# Patient Record
Sex: FEMALE | Race: WHITE
[De-identification: names, ages, dates, MRNs, and addresses within clinical notes are randomized per-mention and may not be internally consistent; named-entity substitution may affect disease eponyms.]

---

## 2017-09-07 PROBLEM — Z00.00 ENCOUNTER FOR PREVENTIVE HEALTH EXAMINATION: Status: ACTIVE | Noted: 2017-09-07

## 2017-09-13 ENCOUNTER — APPOINTMENT (OUTPATIENT)
Dept: OBGYN | Facility: CLINIC | Age: 32
End: 2017-09-13

## 2018-06-24 ENCOUNTER — RESULT REVIEW (OUTPATIENT)
Age: 33
End: 2018-06-24

## 2018-06-24 ENCOUNTER — INPATIENT (INPATIENT)
Facility: HOSPITAL | Age: 33
LOS: 2 days | Discharge: HOME | End: 2018-06-27
Attending: OBSTETRICS & GYNECOLOGY | Admitting: OBSTETRICS & GYNECOLOGY
Payer: COMMERCIAL

## 2018-06-24 VITALS — WEIGHT: 200.62 LBS | HEIGHT: 66 IN

## 2018-06-24 DIAGNOSIS — Z3A.00 WEEKS OF GESTATION OF PREGNANCY NOT SPECIFIED: ICD-10-CM

## 2018-06-24 DIAGNOSIS — O26.899 OTHER SPECIFIED PREGNANCY RELATED CONDITIONS, UNSPECIFIED TRIMESTER: ICD-10-CM

## 2018-06-24 LAB
BASOPHILS NFR BLD AUTO: 0.3 % — SIGNIFICANT CHANGE UP (ref 0–2)
BLD GP AB SCN SERPL QL: NEGATIVE — SIGNIFICANT CHANGE UP
EOSINOPHIL NFR BLD AUTO: 1 % — SIGNIFICANT CHANGE UP (ref 0–6)
HCT VFR BLD CALC: 35.9 % — SIGNIFICANT CHANGE UP (ref 34.5–45)
HGB BLD-MCNC: 12.6 G/DL — SIGNIFICANT CHANGE UP (ref 11.5–15.5)
LYMPHOCYTES # BLD AUTO: 21.4 % — SIGNIFICANT CHANGE UP (ref 13–44)
MCHC RBC-ENTMCNC: 31.5 PG — SIGNIFICANT CHANGE UP (ref 27–34)
MCHC RBC-ENTMCNC: 35.1 G/DL — SIGNIFICANT CHANGE UP (ref 32–36)
MCV RBC AUTO: 89.8 FL — SIGNIFICANT CHANGE UP (ref 80–100)
MONOCYTES NFR BLD AUTO: 9.9 % — SIGNIFICANT CHANGE UP (ref 2–14)
NEUTROPHILS NFR BLD AUTO: 67.4 % — SIGNIFICANT CHANGE UP (ref 43–77)
PLATELET # BLD AUTO: 182 K/UL — SIGNIFICANT CHANGE UP (ref 150–400)
RBC # BLD: 4 M/UL — SIGNIFICANT CHANGE UP (ref 3.8–5.2)
RBC # FLD: 12 % — SIGNIFICANT CHANGE UP (ref 10.3–16.9)
RH IG SCN BLD-IMP: POSITIVE — SIGNIFICANT CHANGE UP
RH IG SCN BLD-IMP: POSITIVE — SIGNIFICANT CHANGE UP
WBC # BLD: 9 K/UL — SIGNIFICANT CHANGE UP (ref 3.8–10.5)
WBC # FLD AUTO: 9 K/UL — SIGNIFICANT CHANGE UP (ref 3.8–10.5)

## 2018-06-24 RX ORDER — SODIUM CHLORIDE 9 MG/ML
1000 INJECTION, SOLUTION INTRAVENOUS
Qty: 0 | Refills: 0 | Status: DISCONTINUED | OUTPATIENT
Start: 2018-06-24 | End: 2018-06-24

## 2018-06-24 RX ORDER — SODIUM CHLORIDE 9 MG/ML
1000 INJECTION, SOLUTION INTRAVENOUS
Qty: 0 | Refills: 0 | Status: DISCONTINUED | OUTPATIENT
Start: 2018-06-24 | End: 2018-06-27

## 2018-06-24 RX ORDER — SODIUM CHLORIDE 9 MG/ML
1000 INJECTION, SOLUTION INTRAVENOUS ONCE
Qty: 0 | Refills: 0 | Status: COMPLETED | OUTPATIENT
Start: 2018-06-24 | End: 2018-06-24

## 2018-06-24 RX ORDER — LANOLIN
1 OINTMENT (GRAM) TOPICAL
Qty: 0 | Refills: 0 | Status: DISCONTINUED | OUTPATIENT
Start: 2018-06-24 | End: 2018-06-27

## 2018-06-24 RX ORDER — SIMETHICONE 80 MG/1
80 TABLET, CHEWABLE ORAL EVERY 4 HOURS
Qty: 0 | Refills: 0 | Status: DISCONTINUED | OUTPATIENT
Start: 2018-06-24 | End: 2018-06-27

## 2018-06-24 RX ORDER — ACETAMINOPHEN 500 MG
650 TABLET ORAL EVERY 6 HOURS
Qty: 0 | Refills: 0 | Status: DISCONTINUED | OUTPATIENT
Start: 2018-06-24 | End: 2018-06-27

## 2018-06-24 RX ORDER — DIPHENHYDRAMINE HCL 50 MG
25 CAPSULE ORAL EVERY 6 HOURS
Qty: 0 | Refills: 0 | Status: DISCONTINUED | OUTPATIENT
Start: 2018-06-24 | End: 2018-06-27

## 2018-06-24 RX ORDER — CITRIC ACID/SODIUM CITRATE 300-500 MG
30 SOLUTION, ORAL ORAL ONCE
Qty: 0 | Refills: 0 | Status: DISCONTINUED | OUTPATIENT
Start: 2018-06-24 | End: 2018-06-24

## 2018-06-24 RX ORDER — NALOXONE HYDROCHLORIDE 4 MG/.1ML
0.1 SPRAY NASAL
Qty: 0 | Refills: 0 | Status: DISCONTINUED | OUTPATIENT
Start: 2018-06-24 | End: 2018-06-27

## 2018-06-24 RX ORDER — OXYCODONE AND ACETAMINOPHEN 5; 325 MG/1; MG/1
1 TABLET ORAL
Qty: 0 | Refills: 0 | Status: DISCONTINUED | OUTPATIENT
Start: 2018-06-24 | End: 2018-06-27

## 2018-06-24 RX ORDER — TETANUS TOXOID, REDUCED DIPHTHERIA TOXOID AND ACELLULAR PERTUSSIS VACCINE, ADSORBED 5; 2.5; 8; 8; 2.5 [IU]/.5ML; [IU]/.5ML; UG/.5ML; UG/.5ML; UG/.5ML
0.5 SUSPENSION INTRAMUSCULAR ONCE
Qty: 0 | Refills: 0 | Status: DISCONTINUED | OUTPATIENT
Start: 2018-06-24 | End: 2018-06-27

## 2018-06-24 RX ORDER — SODIUM CHLORIDE 9 MG/ML
500 INJECTION, SOLUTION INTRAVENOUS ONCE
Qty: 0 | Refills: 0 | Status: DISCONTINUED | OUTPATIENT
Start: 2018-06-24 | End: 2018-06-24

## 2018-06-24 RX ORDER — ONDANSETRON 8 MG/1
4 TABLET, FILM COATED ORAL EVERY 6 HOURS
Qty: 0 | Refills: 0 | Status: DISCONTINUED | OUTPATIENT
Start: 2018-06-24 | End: 2018-06-27

## 2018-06-24 RX ORDER — GLYCERIN ADULT
1 SUPPOSITORY, RECTAL RECTAL AT BEDTIME
Qty: 0 | Refills: 0 | Status: DISCONTINUED | OUTPATIENT
Start: 2018-06-24 | End: 2018-06-27

## 2018-06-24 RX ORDER — OXYTOCIN 10 UNIT/ML
41.67 VIAL (ML) INJECTION
Qty: 20 | Refills: 0 | Status: DISCONTINUED | OUTPATIENT
Start: 2018-06-24 | End: 2018-06-27

## 2018-06-24 RX ORDER — OXYTOCIN 10 UNIT/ML
333.33 VIAL (ML) INJECTION
Qty: 20 | Refills: 0 | Status: DISCONTINUED | OUTPATIENT
Start: 2018-06-24 | End: 2018-06-24

## 2018-06-24 RX ORDER — OXYTOCIN 10 UNIT/ML
1 VIAL (ML) INJECTION
Qty: 30 | Refills: 0 | Status: DISCONTINUED | OUTPATIENT
Start: 2018-06-24 | End: 2018-06-24

## 2018-06-24 RX ORDER — AZITHROMYCIN 500 MG/1
500 TABLET, FILM COATED ORAL ONCE
Qty: 0 | Refills: 0 | Status: DISCONTINUED | OUTPATIENT
Start: 2018-06-24 | End: 2018-06-24

## 2018-06-24 RX ORDER — DOCUSATE SODIUM 100 MG
100 CAPSULE ORAL
Qty: 0 | Refills: 0 | Status: DISCONTINUED | OUTPATIENT
Start: 2018-06-24 | End: 2018-06-27

## 2018-06-24 RX ORDER — FERROUS SULFATE 325(65) MG
325 TABLET ORAL DAILY
Qty: 0 | Refills: 0 | Status: DISCONTINUED | OUTPATIENT
Start: 2018-06-24 | End: 2018-06-27

## 2018-06-24 RX ORDER — CITRIC ACID/SODIUM CITRATE 300-500 MG
15 SOLUTION, ORAL ORAL EVERY 4 HOURS
Qty: 0 | Refills: 0 | Status: DISCONTINUED | OUTPATIENT
Start: 2018-06-24 | End: 2018-06-24

## 2018-06-24 RX ORDER — GENTAMICIN SULFATE 40 MG/ML
275 VIAL (ML) INJECTION ONCE
Qty: 0 | Refills: 0 | Status: DISCONTINUED | OUTPATIENT
Start: 2018-06-24 | End: 2018-06-24

## 2018-06-24 RX ORDER — IBUPROFEN 200 MG
600 TABLET ORAL EVERY 6 HOURS
Qty: 0 | Refills: 0 | Status: DISCONTINUED | OUTPATIENT
Start: 2018-06-24 | End: 2018-06-27

## 2018-06-24 RX ORDER — HEPARIN SODIUM 5000 [USP'U]/ML
5000 INJECTION INTRAVENOUS; SUBCUTANEOUS EVERY 12 HOURS
Qty: 0 | Refills: 0 | Status: DISCONTINUED | OUTPATIENT
Start: 2018-06-25 | End: 2018-06-27

## 2018-06-24 RX ORDER — OXYCODONE AND ACETAMINOPHEN 5; 325 MG/1; MG/1
2 TABLET ORAL EVERY 6 HOURS
Qty: 0 | Refills: 0 | Status: DISCONTINUED | OUTPATIENT
Start: 2018-06-24 | End: 2018-06-27

## 2018-06-24 RX ADMIN — SODIUM CHLORIDE 125 MILLILITER(S): 9 INJECTION, SOLUTION INTRAVENOUS at 03:04

## 2018-06-24 RX ADMIN — SODIUM CHLORIDE 125 MILLILITER(S): 9 INJECTION, SOLUTION INTRAVENOUS at 12:31

## 2018-06-24 RX ADMIN — Medication 1 MILLIUNIT(S)/MIN: at 05:08

## 2018-06-24 RX ADMIN — Medication 15 MILLILITER(S): at 17:31

## 2018-06-24 RX ADMIN — SODIUM CHLORIDE 2000 MILLILITER(S): 9 INJECTION, SOLUTION INTRAVENOUS at 08:06

## 2018-06-24 RX ADMIN — SODIUM CHLORIDE 125 MILLILITER(S): 9 INJECTION, SOLUTION INTRAVENOUS at 05:08

## 2018-06-24 RX ADMIN — Medication 100 MILLIGRAM(S): at 17:29

## 2018-06-25 LAB
HCT VFR BLD CALC: 31.8 % — LOW (ref 34.5–45)
HGB BLD-MCNC: 10.7 G/DL — LOW (ref 11.5–15.5)
MCHC RBC-ENTMCNC: 30.3 PG — SIGNIFICANT CHANGE UP (ref 27–34)
MCHC RBC-ENTMCNC: 33.6 G/DL — SIGNIFICANT CHANGE UP (ref 32–36)
MCV RBC AUTO: 90.1 FL — SIGNIFICANT CHANGE UP (ref 80–100)
PLATELET # BLD AUTO: 170 K/UL — SIGNIFICANT CHANGE UP (ref 150–400)
RBC # BLD: 3.53 M/UL — LOW (ref 3.8–5.2)
RBC # FLD: 12.5 % — SIGNIFICANT CHANGE UP (ref 10.3–16.9)
T PALLIDUM AB TITR SER: NEGATIVE — SIGNIFICANT CHANGE UP
WBC # BLD: 16.2 K/UL — HIGH (ref 3.8–10.5)
WBC # FLD AUTO: 16.2 K/UL — HIGH (ref 3.8–10.5)

## 2018-06-25 RX ORDER — INFLUENZA VIRUS VACCINE 15; 15; 15; 15 UG/.5ML; UG/.5ML; UG/.5ML; UG/.5ML
0.5 SUSPENSION INTRAMUSCULAR ONCE
Qty: 0 | Refills: 0 | Status: COMPLETED | OUTPATIENT
Start: 2018-06-25 | End: 2018-06-25

## 2018-06-25 RX ADMIN — Medication 600 MILLIGRAM(S): at 01:40

## 2018-06-25 RX ADMIN — Medication 600 MILLIGRAM(S): at 23:56

## 2018-06-25 RX ADMIN — OXYCODONE AND ACETAMINOPHEN 1 TABLET(S): 5; 325 TABLET ORAL at 13:27

## 2018-06-25 RX ADMIN — Medication 600 MILLIGRAM(S): at 11:46

## 2018-06-25 RX ADMIN — HEPARIN SODIUM 5000 UNIT(S): 5000 INJECTION INTRAVENOUS; SUBCUTANEOUS at 18:39

## 2018-06-25 RX ADMIN — Medication 600 MILLIGRAM(S): at 18:39

## 2018-06-25 RX ADMIN — Medication 600 MILLIGRAM(S): at 01:12

## 2018-06-25 RX ADMIN — Medication 600 MILLIGRAM(S): at 12:40

## 2018-06-25 RX ADMIN — Medication 600 MILLIGRAM(S): at 06:01

## 2018-06-25 RX ADMIN — Medication 600 MILLIGRAM(S): at 06:40

## 2018-06-25 RX ADMIN — HEPARIN SODIUM 5000 UNIT(S): 5000 INJECTION INTRAVENOUS; SUBCUTANEOUS at 06:47

## 2018-06-25 RX ADMIN — Medication 600 MILLIGRAM(S): at 19:09

## 2018-06-25 RX ADMIN — Medication 325 MILLIGRAM(S): at 10:01

## 2018-06-25 RX ADMIN — OXYCODONE AND ACETAMINOPHEN 1 TABLET(S): 5; 325 TABLET ORAL at 12:39

## 2018-06-25 RX ADMIN — Medication 1 TABLET(S): at 10:01

## 2018-06-25 NOTE — LACTATION INITIAL EVALUATION - NS LACT CON REASON FOR REQ
38.6 wk gestation baby, about 15 hrs old at this time, with 2 wet and 4 dirty diapers documented thus far since birth.  Reclined the mother back comfortably and placed the baby STS, while guiding the mother through a baby-led approach by having her support her breast and the babies body, allowing the baby to self attach. With this minimal assistance, the baby was able to latch on deeply to the breast and is rhythmically sucking between short pauses of rest, swallows noted. I provided breastfeeding education and explained normal  behaviour and the milk production feedback system. Mother to continue with STS when possible, room-in, and feed as per cues at least 8-12x/ day. To f/u as needed.

## 2018-06-25 NOTE — PROGRESS NOTE ADULT - SUBJECTIVE AND OBJECTIVE BOX
Patient evaluated at bedside.   She reports pain is well controlled.  She denies headache, dizziness, chest pain, palpitations, shortness of breathe, nausea, vomiting or heavy vaginal bleeding.  Patient has been tolerating water and is looking forward to having clears. She plans to ambulate once her alejandro is removed. No flatus. Patient is breastfeeding without difficulty.     Physical Exam:  Vital Signs Last 24 Hrs  T(C): 36.6 (25 Jun 2018 06:27), Max: 37.2 (24 Jun 2018 18:50)  T(F): 97.9 (25 Jun 2018 06:27), Max: 99 (24 Jun 2018 18:50)  HR: 70 (25 Jun 2018 06:27) (70 - 90)  BP: 99/63 (25 Jun 2018 06:27) (99/63 - 130/68)  BP(mean): --  RR: 16 (25 Jun 2018 06:27) (16 - 28)  SpO2: 97% (25 Jun 2018 06:27) (95% - 100%)    06-24 @ 07:01  -  06-25 @ 06:42  --------------------------------------------------------  IN: 200 mL / OUT: 4450 mL / NET: -4250 mL        GA: NAD, resting comfortably   Abd: + BS, soft, appropriately tender, nondistended, no rebound or guarding, uterus firm at midline,  fb below umbilicus  Incision: well approximated, no erythema or discharge  : lochia WNL  Extremities: no swelling or calf tenderness                            12.6   9.0   )-----------( 182      ( 24 Jun 2018 03:06 )             35.9

## 2018-06-26 RX ADMIN — OXYCODONE AND ACETAMINOPHEN 1 TABLET(S): 5; 325 TABLET ORAL at 13:00

## 2018-06-26 RX ADMIN — Medication 600 MILLIGRAM(S): at 19:39

## 2018-06-26 RX ADMIN — OXYCODONE AND ACETAMINOPHEN 1 TABLET(S): 5; 325 TABLET ORAL at 02:07

## 2018-06-26 RX ADMIN — Medication 600 MILLIGRAM(S): at 06:15

## 2018-06-26 RX ADMIN — HEPARIN SODIUM 5000 UNIT(S): 5000 INJECTION INTRAVENOUS; SUBCUTANEOUS at 05:29

## 2018-06-26 RX ADMIN — OXYCODONE AND ACETAMINOPHEN 1 TABLET(S): 5; 325 TABLET ORAL at 20:19

## 2018-06-26 RX ADMIN — Medication 600 MILLIGRAM(S): at 00:30

## 2018-06-26 RX ADMIN — Medication 600 MILLIGRAM(S): at 12:13

## 2018-06-26 RX ADMIN — OXYCODONE AND ACETAMINOPHEN 1 TABLET(S): 5; 325 TABLET ORAL at 01:13

## 2018-06-26 RX ADMIN — Medication 325 MILLIGRAM(S): at 09:00

## 2018-06-26 RX ADMIN — Medication 100 MILLIGRAM(S): at 09:00

## 2018-06-26 RX ADMIN — Medication 600 MILLIGRAM(S): at 05:28

## 2018-06-26 RX ADMIN — OXYCODONE AND ACETAMINOPHEN 1 TABLET(S): 5; 325 TABLET ORAL at 12:14

## 2018-06-26 RX ADMIN — Medication 1 TABLET(S): at 09:00

## 2018-06-26 RX ADMIN — Medication 600 MILLIGRAM(S): at 11:45

## 2018-06-26 RX ADMIN — Medication 600 MILLIGRAM(S): at 18:35

## 2018-06-26 RX ADMIN — OXYCODONE AND ACETAMINOPHEN 1 TABLET(S): 5; 325 TABLET ORAL at 19:38

## 2018-06-26 NOTE — PROGRESS NOTE ADULT - SUBJECTIVE AND OBJECTIVE BOX
Patient evaluated at bedside secondary to temperatures of 100.6 degrees F and then 100.0 degrees F as informed by nursing.     Upon evaluation, patient resting in bed, breastfeeding baby. Patient reports an episode of feeling cold and shivering which woke her up from sleep. Her temperature at this time was 100.6. Repeat temperature in 30 minutes was 100 degrees. Patient now reports that she feels well. She feels that her pain has increased but that she hasn't had a Percocet in many hours. She plans to take one now. She reports that she no longer feels cold. She has been ambulating and voiding without difficulty. She denies any complaints at this time. She would like to rest now. On exam, her abdomen is soft, appropriately tender. Incision is well approximated with intact steristrips in place.      Will continue to follow closely. Continue routine postoperative care.     D/w Dr. Carter.

## 2018-06-26 NOTE — PROGRESS NOTE ADULT - SUBJECTIVE AND OBJECTIVE BOX
Patient evaluated at bedside.   She reports pain is well controlled.  She denies headache, dizziness, chest pain, palpitations, shortness of breathe, nausea, vomiting or heavy vaginal bleeding.  She has been ambulating without assistance, voiding spontaneously, passing gas, tolerating regular diet and is breastfeeding. Patient feels well. She is looking forward to breakfast.     Physical Exam:  Vital Signs Last 24 Hrs  T(C): 36.7 (26 Jun 2018 06:37), Max: 36.7 (25 Jun 2018 22:07)  T(F): 98.1 (26 Jun 2018 06:37), Max: 98.1 (26 Jun 2018 06:37)  HR: 73 (26 Jun 2018 06:37) (73 - 82)  BP: 106/71 (26 Jun 2018 06:37) (105/73 - 121/67)  BP(mean): --  RR: 20 (26 Jun 2018 06:37) (18 - 20)  SpO2: 96% (26 Jun 2018 06:37) (96% - 98%)    06-25 @ 07:01  -  06-26 @ 07:00  --------------------------------------------------------  IN: 0 mL / OUT: 600 mL / NET: -600 mL        GA: NAD, resting comfortably   Abd: + BS, soft, appropriately tender, nondistended, no rebound or guarding, uterus firm at midline,  fb below umbilicus  Incision: well approximated, no erythema or discharge  : lochia WNL  Extremities: no swelling or calf tenderness                            10.7   16.2  )-----------( 170      ( 25 Jun 2018 08:17 )             31.8

## 2018-06-26 NOTE — PROGRESS NOTE ADULT - ASSESSMENT
A/P  34yo s/p c/s, POD #2, stable and meeting postoperative milestones appropriately.   1. Pain: controlled with OPM.   2. GI: Regular diet as tolerated   3. : voiding   4. DVT prophylaxis: ambulation, Subcutaneous heparin   5. Dispo: Likely POD3 or 4

## 2018-06-27 ENCOUNTER — TRANSCRIPTION ENCOUNTER (OUTPATIENT)
Age: 33
End: 2018-06-27

## 2018-06-27 VITALS
RESPIRATION RATE: 18 BRPM | HEART RATE: 77 BPM | SYSTOLIC BLOOD PRESSURE: 115 MMHG | DIASTOLIC BLOOD PRESSURE: 75 MMHG | OXYGEN SATURATION: 97 % | TEMPERATURE: 98 F

## 2018-06-27 PROCEDURE — 85025 COMPLETE CBC W/AUTO DIFF WBC: CPT

## 2018-06-27 PROCEDURE — 86850 RBC ANTIBODY SCREEN: CPT

## 2018-06-27 PROCEDURE — 86901 BLOOD TYPING SEROLOGIC RH(D): CPT

## 2018-06-27 PROCEDURE — 88307 TISSUE EXAM BY PATHOLOGIST: CPT

## 2018-06-27 PROCEDURE — 85027 COMPLETE CBC AUTOMATED: CPT

## 2018-06-27 PROCEDURE — 36415 COLL VENOUS BLD VENIPUNCTURE: CPT

## 2018-06-27 PROCEDURE — 86780 TREPONEMA PALLIDUM: CPT

## 2018-06-27 PROCEDURE — 86900 BLOOD TYPING SEROLOGIC ABO: CPT

## 2018-06-27 PROCEDURE — 99214 OFFICE O/P EST MOD 30 MIN: CPT

## 2018-06-27 RX ORDER — IBUPROFEN 200 MG
1 TABLET ORAL
Qty: 0 | Refills: 0 | COMMUNITY
Start: 2018-06-27

## 2018-06-27 RX ADMIN — Medication 600 MILLIGRAM(S): at 06:24

## 2018-06-27 RX ADMIN — OXYCODONE AND ACETAMINOPHEN 1 TABLET(S): 5; 325 TABLET ORAL at 01:55

## 2018-06-27 RX ADMIN — Medication 600 MILLIGRAM(S): at 01:54

## 2018-06-27 RX ADMIN — Medication 1 TABLET(S): at 13:08

## 2018-06-27 RX ADMIN — Medication 600 MILLIGRAM(S): at 01:01

## 2018-06-27 RX ADMIN — OXYCODONE AND ACETAMINOPHEN 1 TABLET(S): 5; 325 TABLET ORAL at 15:31

## 2018-06-27 RX ADMIN — OXYCODONE AND ACETAMINOPHEN 1 TABLET(S): 5; 325 TABLET ORAL at 10:25

## 2018-06-27 RX ADMIN — Medication 100 MILLIGRAM(S): at 13:08

## 2018-06-27 RX ADMIN — Medication 600 MILLIGRAM(S): at 07:44

## 2018-06-27 RX ADMIN — OXYCODONE AND ACETAMINOPHEN 1 TABLET(S): 5; 325 TABLET ORAL at 02:59

## 2018-06-27 RX ADMIN — OXYCODONE AND ACETAMINOPHEN 1 TABLET(S): 5; 325 TABLET ORAL at 16:05

## 2018-06-27 RX ADMIN — Medication 1 APPLICATION(S): at 06:49

## 2018-06-27 RX ADMIN — Medication 600 MILLIGRAM(S): at 13:08

## 2018-06-27 RX ADMIN — OXYCODONE AND ACETAMINOPHEN 1 TABLET(S): 5; 325 TABLET ORAL at 11:10

## 2018-06-27 RX ADMIN — Medication 325 MILLIGRAM(S): at 13:08

## 2018-06-27 RX ADMIN — Medication 600 MILLIGRAM(S): at 13:45

## 2018-06-27 NOTE — PROGRESS NOTE ADULT - SUBJECTIVE AND OBJECTIVE BOX
Patient evaluated at bedside. No acute events overnight. She reports pain is well controlled with OPM. Adequate urine output.     Physical Exam:  Vital Signs Last 24 Hrs  T(C): 36.8 (27 Jun 2018 06:00), Max: 38.1 (26 Jun 2018 15:30)  T(F): 98.2 (27 Jun 2018 06:00), Max: 100.6 (26 Jun 2018 15:30)  HR: 69 (27 Jun 2018 06:00) (69 - 88)  BP: 110/73 (27 Jun 2018 06:00) (102/62 - 110/74)  BP(mean): --  RR: 18 (27 Jun 2018 06:00) (17 - 18)  SpO2: 97% (27 Jun 2018 06:00) (97% - 98%)    GA: NAD, A+0 x 3  Abd: + BS, soft, nontender, nondistended, no rebound or guarding, uterus firm at midline, 2 fb below umbilicus  Incision clean, dry and intact  : lochia WNL  Extremities: no swelling or calf tenderness    A/P  yo 33y s/p c/s, POD #3  ,stable    Diet: regular  Pain: OPM  OOB/SCDs/IS  Continue to monitor  Anticipate discharge today

## 2018-06-27 NOTE — DISCHARGE NOTE OB - CARE PLAN
Principal Discharge DX:	Postpartum state  Goal:	postpartum care  Assessment and plan of treatment:	33 year old female s/p  section, post operative day 4, meeting all postpartum milestones. No heavy lifting. Pelvic rest until cleared. Follow-up with obstetrician in 1-2 weeks.

## 2018-06-27 NOTE — PROGRESS NOTE ADULT - SUBJECTIVE AND OBJECTIVE BOX
Patient evaluated at bedside.   She reports pain is well controlled.  She denies headache, dizziness, chest pain, palpitations, shortness of breathe, nausea, vomiting or heavy vaginal bleeding.  She has been ambulating without assistance, voiding spontaneously, passing gas, tolerating regular diet and is breastfeeding. Patient is looking forward to going home today.     Physical Exam:  Vital Signs Last 24 Hrs  T(C): 36.8 (27 Jun 2018 06:00), Max: 38.1 (26 Jun 2018 15:30)  T(F): 98.2 (27 Jun 2018 06:00), Max: 100.6 (26 Jun 2018 15:30)  HR: 69 (27 Jun 2018 06:00) (69 - 88)  BP: 110/73 (27 Jun 2018 06:00) (102/62 - 110/74)  BP(mean): --  RR: 18 (27 Jun 2018 06:00) (17 - 18)  SpO2: 97% (27 Jun 2018 06:00) (97% - 98%)      GA: NAD, resting comfortably   Abd: + BS, soft, nontender, nondistended, no rebound or guarding, uterus firm at midline,  fb below umbilicus  Incision: well approximated, no erythema or discharge  : lochia WNL  Extremities: no swelling or calf tenderness                            10.7   16.2  )-----------( 170      ( 25 Jun 2018 08:17 )             31.8

## 2018-06-27 NOTE — DISCHARGE NOTE OB - PATIENT PORTAL LINK FT
You can access the [facility] Patient Portal, offered by [facility], by registering with the following website: [email or web address]

## 2018-06-27 NOTE — DISCHARGE NOTE OB - PLAN OF CARE
postpartum care 33 year old female s/p  section, post operative day 4, meeting all postpartum milestones. No heavy lifting. Pelvic rest until cleared. Follow-up with obstetrician in 1-2 weeks.

## 2018-06-27 NOTE — DISCHARGE NOTE OB - MEDICATION SUMMARY - MEDICATIONS TO TAKE
I will START or STAY ON the medications listed below when I get home from the hospital:    ibuprofen 600 mg oral tablet  -- 1 tab(s) by mouth every 6 hours, As Needed for pain  -- Indication: For Pain    Prenatal 1 Plus 1 (Low Iron) oral tablet  -- 1 tab(s) by mouth once a day  -- Indication: For Postpartum state

## 2018-06-27 NOTE — PROGRESS NOTE ADULT - ASSESSMENT
A/P  34yo s/p c/s, POD #3, stable and meeting postoperative milestones appropriately.   1. Pain: controlled with OPM.   2. GI: Regular diet as tolerated   3. : voiding   4. DVT prophylaxis: ambulation, Subcutaneous heparin   5. Dispo: Likely POD3 or 4

## 2018-06-27 NOTE — DISCHARGE NOTE OB - CARE PROVIDER_API CALL
Marilee Lutz (), Obstetrics and Gynecology  [address] [place] [id number]  Phone: [phone]  Fax: [phone]

## 2018-07-01 ENCOUNTER — TRANSCRIPTION ENCOUNTER (OUTPATIENT)
Age: 33
End: 2018-07-01

## 2018-07-01 DIAGNOSIS — J45.20 MILD INTERMITTENT ASTHMA, UNCOMPLICATED: ICD-10-CM

## 2018-07-01 DIAGNOSIS — Z3A.38 38 WEEKS GESTATION OF PREGNANCY: ICD-10-CM

## 2018-07-02 LAB — SURGICAL PATHOLOGY STUDY: SIGNIFICANT CHANGE UP

## 2021-10-28 ENCOUNTER — RESULT REVIEW (OUTPATIENT)
Age: 36
End: 2021-10-28

## 2025-06-04 NOTE — DISCHARGE NOTE OB - DRINK 8 TO 10 GLASSES OF FLUID EACH DAY
Ambulatory Care Coordination Note     6/4/2025 4:46 PM     Patient outreach attempt by this ACM today to offer care management services. ACM was unable to reach the patient by telephone today;   left voice message requesting a return phone call to this ACM. UTR, [id number]     ACM: Nell Perrin, PERCY         PCP/Specialist follow up:   Future Appointments         Provider Specialty Dept Phone    7/16/2025 9:50 AM Rosy Toro MD Internal Medicine [phone]    7/31/2025 9:00 AM Raudel Brown MD Pulmonology [phone]    8/22/2025 9:00 AM Ian Tirado MD Cardiology [phone]    11/18/2025 9:00 AM Jayro Eric MD Family Medicine [phone]            Follow Up:   Plan for next ACM outreach in approximately 1 week to complete:  - outreach attempt to offer care management services.        Nell PRESTON, RN  Respecting Choices® Advanced Steps ACP Facilitator  Ambulatory Care Manager  [facility]  [phone]              
Statement Selected